# Patient Record
Sex: FEMALE | Race: WHITE | NOT HISPANIC OR LATINO | Employment: OTHER | URBAN - METROPOLITAN AREA
[De-identification: names, ages, dates, MRNs, and addresses within clinical notes are randomized per-mention and may not be internally consistent; named-entity substitution may affect disease eponyms.]

---

## 2017-07-03 ENCOUNTER — OFFICE VISIT (OUTPATIENT)
Dept: OPHTHALMOLOGY | Facility: CLINIC | Age: 82
End: 2017-07-03
Payer: MEDICARE

## 2017-07-03 DIAGNOSIS — Z96.1 PSEUDOPHAKIA: ICD-10-CM

## 2017-07-03 DIAGNOSIS — H52.7 REFRACTION ERROR: ICD-10-CM

## 2017-07-03 DIAGNOSIS — H35.3130 BILATERAL NONEXUDATIVE AGE-RELATED MACULAR DEGENERATION: ICD-10-CM

## 2017-07-03 DIAGNOSIS — H25.13 NS (NUCLEAR SCLEROSIS), BILATERAL: Primary | ICD-10-CM

## 2017-07-03 DIAGNOSIS — H43.813 POSTERIOR VITREOUS DETACHMENT OF BOTH EYES: ICD-10-CM

## 2017-07-03 PROCEDURE — 99999 PR PBB SHADOW E&M-EST. PATIENT-LVL II: CPT | Mod: PBBFAC,,, | Performed by: OPHTHALMOLOGY

## 2017-07-03 PROCEDURE — 92014 COMPRE OPH EXAM EST PT 1/>: CPT | Mod: S$GLB,,, | Performed by: OPHTHALMOLOGY

## 2017-07-03 NOTE — PROGRESS NOTES
Subjective:       Patient ID: Mag Valdez is a 81 y.o. female.    Chief Complaint: Eye Exam    HPI  Review of Systems    Objective:      Physical Exam    Assessment:       1. NS (nuclear sclerosis), bilateral    2. Bilateral nonexudative age-related macular degeneration    3. Posterior vitreous detachment of both eyes    4. Refraction error    5. Pseudophakia        Plan:       Cataract OS- Not visually significant.     Dry AMD OU-Stable.  PVD's OU-Stable.  RE-Pt does not want MRx.        Cont AREDS/AG.  RTC 1 yr.

## 2018-06-04 ENCOUNTER — OFFICE VISIT (OUTPATIENT)
Dept: FAMILY MEDICINE | Facility: CLINIC | Age: 83
End: 2018-06-04
Payer: MEDICARE

## 2018-06-04 VITALS
DIASTOLIC BLOOD PRESSURE: 70 MMHG | HEART RATE: 70 BPM | HEIGHT: 66 IN | BODY MASS INDEX: 23.08 KG/M2 | OXYGEN SATURATION: 97 % | TEMPERATURE: 98 F | SYSTOLIC BLOOD PRESSURE: 140 MMHG | WEIGHT: 143.63 LBS

## 2018-06-04 DIAGNOSIS — Z11.1 SCREENING-PULMONARY TB: ICD-10-CM

## 2018-06-04 DIAGNOSIS — Z00.00 ANNUAL PHYSICAL EXAM: Primary | ICD-10-CM

## 2018-06-04 DIAGNOSIS — M81.8 OTHER OSTEOPOROSIS WITHOUT CURRENT PATHOLOGICAL FRACTURE: ICD-10-CM

## 2018-06-04 PROCEDURE — 99397 PER PM REEVAL EST PAT 65+ YR: CPT | Mod: S$GLB,,, | Performed by: FAMILY MEDICINE

## 2018-06-04 PROCEDURE — 86580 TB INTRADERMAL TEST: CPT | Mod: S$GLB,,, | Performed by: FAMILY MEDICINE

## 2018-06-04 PROCEDURE — 99999 PR PBB SHADOW E&M-EST. PATIENT-LVL III: CPT | Mod: PBBFAC,,, | Performed by: FAMILY MEDICINE

## 2018-06-04 NOTE — PROGRESS NOTES
Office Visit    Patient Name: Mag Valdez    : 1935  MRN: [id number]      Assessment/Plan:  Mag Valdez is a 82 y.o. female who presents today for :    Annual physical exam    Screening-pulmonary TB  -     POCT TB Skin Test Read    Other osteoporosis without current pathological fracture   -     DXA Bone Density Spine And Hip; Future; Expected date: 2018      -pt declined labs today, plans to est care with new PCP in [place] once she moves into the assisted living facility there.   -anticipatory guidance provided with age appropriate preventative services discussed, healthy diet and regular physical exercise also discussed with patient  -I have assisted patient with filling out her [place] documents today. Patient understands that once she gets settled in [place], she will need to establish care with new PCP and get her medical forms updated with new PCP and have those forms updated on file at her new Assisted Living facility in case they need to get in touch with her new PCP.  RTC in 2-3 days for TB skin test reading.    Follow-up for worsening Sx or as needed.     This note was created by combination of typed  and Dragon dictation.  Transcription errors may be present.  If there are any questions, please contact me.        ----------------------------------------------------------------------------------------------------------------------      HPI:  Mag is a 82 y.o. female who presents today for:        This patient has multiple medical diagnoses as noted below.    This patient is new to me - she previously saw Dr. Dominguez.    Patient is here today for Annual Exam  Patient is doing well and has no major complaints.  She is planning to move into an assisted living facility in [place] ([facility]) - needs medical forms filled out, which she brought today.  Drug use? No, Tobacco use? no, Alcohol use? no  Her POA are her granddaughters. I have assisted patient with  filling out her [place] documents today.  She has no major medical issues except for prior history of cataract surgery in the Left eye. She does not take any prescriptions medications.  She is independent of all ADLs.        Additional ROS  No F/C/wt changes/fatigue  No dysphagia/sore throat/rhinorrhea  No CP/SOB/palpitations/swelling  No cough/wheezing/SOB  No nausea/vomiting/abd pain/no diarrhea, no constipation, no blood in stool  No muscle aches/joint pain   No rashes  No weakness/HA/tingling/numbness  No anxiety/depression  No dysuria/hematuria  No polyuria/polydipsia/fatigue/cold or hot intolerance        Patient Care Team:  Gabriel Dominguez MD as PCP - General (Internal Medicine)      Patient Active Problem List   Diagnosis    Bilateral nonexudative age-related macular degeneration    Posterior vitreous detachment of both eyes    NS (nuclear sclerosis)    Glaucoma suspect of both eyes    OHT (ocular hypertension)    Refraction error    Senile nuclear sclerosis    Post-operative state    Pseudophakia       Current Medications  Medications reviewed and updated.     No current outpatient prescriptions on file.    Past Surgical History:   Procedure Laterality Date    APPENDECTOMY      CATARACT EXTRACTION Right 11/17/2016    Dr. Ribeiro    HEMORRHOID SURGERY      HYSTERECTOMY         Family History   Problem Relation Age of Onset    Glaucoma Mother     Diabetes Mother     No Known Problems Father     No Known Problems Sister     No Known Problems Brother     No Known Problems Maternal Aunt     No Known Problems Maternal Uncle     No Known Problems Paternal Aunt     No Known Problems Paternal Uncle     No Known Problems Maternal Grandmother     No Known Problems Maternal Grandfather     No Known Problems Paternal Grandmother     No Known Problems Paternal Grandfather     Amblyopia Neg Hx     Blindness Neg Hx     Cancer Neg Hx     Cataracts Neg Hx     Hypertension Neg Hx     Macular  "degeneration Neg Hx     Retinal detachment Neg Hx     Strabismus Neg Hx     Stroke Neg Hx     Thyroid disease Neg Hx        Social History     Social History    Marital status:      Spouse name: N/A    Number of children: N/A    Years of education: N/A     Occupational History    Not on file.     Social History Main Topics    Smoking status: Never Smoker    Smokeless tobacco: Never Used    Alcohol use No    Drug use: No    Sexual activity: No     Other Topics Concern    Not on file     Social History Narrative    No narrative on file           Allergies   Review of patient's allergies indicates:  No Known Allergies          Review of Systems  See HPI      Physical Exam  BP (!) 140/70   Pulse 70   Temp 98.3 °F (36.8 °C) (Oral)   Ht 5' 6" (1.676 m)   Wt 65.2 kg (143 lb 10.1 oz)   SpO2 97%   BMI 23.18 kg/m²     GEN: NAD, well developed, pleasant, well nourished  HEENT: NCAT, PERRLA, EOMI, sclera clear, anicteric, bilateral ear exam wnl, O/P clear, MMM with no lesions  NECK: normal, supple with midline trachea, no LAD, no thyromegaly  LUNGS: CTAB, no w/r/r, no increased work of breathing   HEART: RRR, normal S1 and S2, no m/r/g, no edema  ABD: s/nt/nd, NABS  SKIN: normal turgor, no rashes  PSYCH: AOx3, appropriate mood and affect  MSK: warm/well perfused, normal ROM in all 4 extremities, no c/c/e.      Labs  No results found for: LABA1C, HGBA1C  No results found for: NA, K, CL, CO2, BUN, CREATININE, CALCIUM, ANIONGAP, ESTGFRAFRICA, EGFRNONAA  No results found for: CHOL  No results found for: HDL  No results found for: LDLCALC  No results found for: TRIG  No results found for: CHOLHDL  Last set of blood work has been reviewed as noted above.                "

## 2018-06-07 ENCOUNTER — CLINICAL SUPPORT (OUTPATIENT)
Dept: FAMILY MEDICINE | Facility: CLINIC | Age: 83
End: 2018-06-07
Payer: MEDICARE

## 2018-06-07 DIAGNOSIS — Z11.1 ENCOUNTER FOR PPD SKIN TEST READING: Primary | ICD-10-CM

## 2018-06-07 LAB
TB INDURATION - 48 HR READ: NORMAL MM
TB INDURATION - 72 HR READ: NORMAL MM
TB SKIN TEST - 48 HR READ: NORMAL
TB SKIN TEST - 72 HR READ: NORMAL

## 2018-06-07 PROCEDURE — 99499 UNLISTED E&M SERVICE: CPT | Mod: S$GLB,,, | Performed by: FAMILY MEDICINE

## 2018-06-07 NOTE — PROGRESS NOTES
PPD read as negative. Results printed and copy give to patient along with nursing home paperwork completed by Dr. ELISABETH Gallardo.